# Patient Record
Sex: MALE | Race: BLACK OR AFRICAN AMERICAN | NOT HISPANIC OR LATINO | Employment: OTHER | URBAN - METROPOLITAN AREA
[De-identification: names, ages, dates, MRNs, and addresses within clinical notes are randomized per-mention and may not be internally consistent; named-entity substitution may affect disease eponyms.]

---

## 2023-03-25 ENCOUNTER — NURSING HOME VISIT (OUTPATIENT)
Dept: POST ACUTE CARE | Facility: EXTERNAL LOCATION | Age: 83
End: 2023-03-25
Payer: MEDICARE

## 2023-03-25 DIAGNOSIS — I48.0 PAROXYSMAL ATRIAL FIBRILLATION (MULTI): ICD-10-CM

## 2023-03-25 DIAGNOSIS — J43.2 CENTRILOBULAR EMPHYSEMA (MULTI): ICD-10-CM

## 2023-03-25 DIAGNOSIS — G47.33 OBSTRUCTIVE SLEEP APNEA SYNDROME: ICD-10-CM

## 2023-03-25 DIAGNOSIS — I25.10 CORONARY ARTERY DISEASE INVOLVING NATIVE CORONARY ARTERY OF NATIVE HEART WITHOUT ANGINA PECTORIS: ICD-10-CM

## 2023-03-25 DIAGNOSIS — G30.9 MIXED ALZHEIMER'S AND VASCULAR DEMENTIA (MULTI): ICD-10-CM

## 2023-03-25 DIAGNOSIS — F01.50 MIXED ALZHEIMER'S AND VASCULAR DEMENTIA (MULTI): ICD-10-CM

## 2023-03-25 DIAGNOSIS — F02.80 MIXED ALZHEIMER'S AND VASCULAR DEMENTIA (MULTI): ICD-10-CM

## 2023-03-25 DIAGNOSIS — E78.5 DYSLIPIDEMIA: ICD-10-CM

## 2023-03-25 DIAGNOSIS — E46 PROTEIN-CALORIE MALNUTRITION, UNSPECIFIED SEVERITY (MULTI): Primary | ICD-10-CM

## 2023-03-25 PROBLEM — J44.9 COPD (CHRONIC OBSTRUCTIVE PULMONARY DISEASE) (MULTI): Status: ACTIVE | Noted: 2023-03-25

## 2023-03-25 PROBLEM — D64.9 ANEMIA: Status: RESOLVED | Noted: 2023-03-25 | Resolved: 2023-03-25

## 2023-03-25 PROBLEM — G47.30 SLEEP APNEA: Status: ACTIVE | Noted: 2023-03-25

## 2023-03-25 PROCEDURE — 99308 SBSQ NF CARE LOW MDM 20: CPT | Performed by: INTERNAL MEDICINE

## 2023-03-25 RX ORDER — DONEPEZIL HYDROCHLORIDE 10 MG/1
10 TABLET, FILM COATED ORAL
COMMUNITY
Start: 2020-08-14

## 2023-03-25 RX ORDER — MIRTAZAPINE 15 MG/1
15 TABLET, FILM COATED ORAL
COMMUNITY
End: 2023-05-25 | Stop reason: SDUPTHER

## 2023-03-25 RX ORDER — ATORVASTATIN CALCIUM 40 MG/1
40 TABLET, FILM COATED ORAL DAILY
COMMUNITY

## 2023-03-25 RX ORDER — QUETIAPINE FUMARATE 25 MG/1
25 TABLET, FILM COATED ORAL
COMMUNITY
Start: 2023-01-26

## 2023-03-25 NOTE — LETTER
Patient: Flaco White  : 1940    Encounter Date: 2023    Patient ID: Flaco White is a 82 y.o. male who presents for No chief complaint on file..    Assessment/Plan    Problem List Items Addressed This Visit          Nervous    Mixed Alzheimer's and vascular dementia (CMS/HCC)     B12 folic acid Aricept Seroquel         Sleep apnea       Respiratory    COPD (chronic obstructive pulmonary disease) (CMS/HCC)     Inhaler trial Bambi flu pneumonia COVID-19 vaccine            Circulatory    CAD (coronary artery disease)    Paroxysmal atrial fibrillation (CMS/HCC)       Endocrine/Metabolic    Protein-calorie malnutrition, unspecified severity (CMS/HCC) - Primary     Dietitian evaluation Ensure twice a day            Other    Dyslipidemia     Continue Lipitor low-fat diet            Source of history: Nurse, Medical personnel, Medical record, Patient.  History limitation: None.      HPI  This is a 82-year-old patient who had a complex medical problem advanced dementia behavior problem COPD moderate protein calorie malnutrition atrial fibrillation atherosclerotic heart disease hypertension hyperlipidemia sleep apnea osteopenia osteoarthritis evaluated for dementia associated with arthralgia myalgia fatigue tired forgetfulness cough congestions decreased ADL mobility because of the dementia complicated with chronic lung disease and heart disease  Not on File    Medications     Bisacodyl Suppository 10 MG Active 2023     MIRALAX (Polyetheylene glycol) LAXATIVE POWDER Active 2023     Atorvastatin Calcium Oral Tablet 40 MG (Atorvastatin Calcium) Active 2023    Aricept Tablet 10 MG (Donepezil HCl) Active 2023    QUEtiapine Fumarate Tablet 25 MG Active 2023    Acetaminophen Tablet 325 MG Active 2/3/2023     Icy Hot External Liquid (Menthol (Topical Analgesic)) Active 3/16/2023 3/16/2023  Current Outpatient Medications   Medication Sig Dispense Refill   •  donepezil (Aricept) 10 mg tablet Take 1 tablet (10 mg) by mouth.     • QUEtiapine (SEROquel) 25 mg tablet Take 1 tablet (25 mg) by mouth.     • atorvastatin (Lipitor) 40 mg tablet Take 1 tablet (40 mg) by mouth once daily.     • mirtazapine (Remeron) 15 mg tablet Take 1 tablet (15 mg) by mouth.       No current facility-administered medications for this visit.       Objective  Visit Vitals  Smoking Status Former     Current Vitals   BP: 118/70 mmHg  3/21/2023 21:52    Temp:97.6 °F  3/21/2023 21:52  Pulse:70 bpm  3/21/2023 21:52  Weight:118.6 Lbs  3/6/2023 06:57  Resp:16 Breaths/min  3/21/2023 21:52  BS:  O2:98 %  3/21/2023 21:52  Pain:2  3/25/2023 09:11    PHYSICAL EXAM  General: NAD. NCAT. Aox3   HEENT: PERRLA. EOMI. MMM. Nares patent bl.  Cardiovascular: Crackles rales rhonchi  Respiratory: Heart murmur  GI: Soft, NT abdomen. BS present x 4.   : No CVAT BL  MSK: ROM x 4. CTLS non-tender.   Extremities: No edema. Cap refill < 2 sec.   Skin: No rashes or bruises.   Neuro: Dementia the behavior problem psych: Mood wnl.    ROS  Constitutional: Denies fevers, chills, fatigue, weight loss/gain  HEENT: Denies HA, vision changes, hearing loss, sore throat  Cardiac: Denies CP, palpitations, edema  Respiratory: Cough   GI: Constipation  : Denies urinary changes, frequency, hematuria, urgency, retention, flank pain  MSK: Denies joint pain, joint swelling, back pain, neck pain, extremity pain  Neuro: Dementia      There is no immunization history on file for this patient.    No visits with results within 4 Month(s) from this visit.   Latest known visit with results is:   Legacy Encounter on 09/28/2022   Component Date Value Ref Range Status   • Ventricular Rate 09/28/2022 58  BPM Final   • Atrial Rate 09/28/2022 58  BPM Final   • SC Interval 09/28/2022 134  ms Final   • QRS Duration 09/28/2022 70  ms Final   • QT Interval 09/28/2022 374  ms Final   • QTC Calculation(Bazett) 09/28/2022 367  ms Final   • P Axis 09/28/2022  79  degrees Final   • R Axis 09/28/2022 7  degrees Final   • T Axis 09/28/2022 77  degrees Final   • QRS Count 09/28/2022 10  beats Final   • Q Onset 09/28/2022 225  ms Final   • P Onset 09/28/2022 158  ms Final   • P Offset 09/28/2022 204  ms Final   • T Offset 09/28/2022 412  ms Final   • QTC Fredericia 09/28/2022 369  ms Final       Radiology: Reviewed imaging in powerchart.  No results found.    No family history on file.  Social History     Socioeconomic History   • Marital status:      Spouse name: None   • Number of children: None   • Years of education: None   • Highest education level: None   Occupational History   • None   Tobacco Use   • Smoking status: Former     Types: Cigarettes   • Smokeless tobacco: Never   Vaping Use   • Vaping status: None   Substance and Sexual Activity   • Alcohol use: Not Currently   • Drug use: Not Currently   • Sexual activity: None   Other Topics Concern   • None   Social History Narrative   • None     Social Determinants of Health     Financial Resource Strain: Not on file   Food Insecurity: Not on file   Transportation Needs: Not on file   Physical Activity: Not on file   Stress: Not on file   Social Connections: Not on file   Intimate Partner Violence: Not on file   Housing Stability: Not on file     Past Medical History:   Diagnosis Date   • Anemia 03/25/2023   • Arthralgia of temporomandibular joint, unspecified side     TMJ syndrome   • Chronic obstructive pulmonary disease, unspecified (CMS/HCC) 06/14/2013    Chronic obstructive pulmonary disease   • Essential (primary) hypertension 06/14/2013    Hypertension   • Other enthesopathies, not elsewhere classified 05/07/2020    Tendinitis of right shoulder   • Personal history of other diseases of the circulatory system     History of atrial fibrillation   • Personal history of other diseases of the digestive system     History of constipation   • Personal history of other diseases of the musculoskeletal system and  connective tissue     Personal history of arthritis   • Personal history of other specified conditions 08/14/2020    History of short term memory loss   • Personal history of other specified conditions     History of chest pain   • Personal history of other specified conditions     History of gynecomastia   • Rectal polyp 06/14/2013    Rectal polyp     Past Surgical History:   Procedure Laterality Date   • CARDIAC SURGERY  07/05/2016    Heart Surgery   • CATARACT EXTRACTION  09/16/2014    Cataract Surgery   • COLONOSCOPY  07/05/2016    Colonoscopy (Fiberoptic)   • INCISIONAL BREAST BIOPSY  09/16/2014    Incisional Breast Biopsy     * Cannot find OR log *    Charting was completed using voice recognition technology and may include unintended errors.           Electronically Signed By: Fabien Beltran MD   3/25/23  2:00 PM

## 2023-03-25 NOTE — PROGRESS NOTES
Patient ID: Flaco White is a 82 y.o. male who presents for No chief complaint on file..    Assessment/Plan     Problem List Items Addressed This Visit          Nervous    Mixed Alzheimer's and vascular dementia (CMS/HCC)     B12 folic acid Aricept Seroquel         Sleep apnea       Respiratory    COPD (chronic obstructive pulmonary disease) (CMS/HCC)     Inhaler trial Bambi flu pneumonia COVID-19 vaccine            Circulatory    CAD (coronary artery disease)    Paroxysmal atrial fibrillation (CMS/HCC)       Endocrine/Metabolic    Protein-calorie malnutrition, unspecified severity (CMS/HCC) - Primary     Dietitian evaluation Ensure twice a day            Other    Dyslipidemia     Continue Lipitor low-fat diet            Source of history: Nurse, Medical personnel, Medical record, Patient.  History limitation: None.      HPI  This is a 82-year-old patient who had a complex medical problem advanced dementia behavior problem COPD moderate protein calorie malnutrition atrial fibrillation atherosclerotic heart disease hypertension hyperlipidemia sleep apnea osteopenia osteoarthritis evaluated for dementia associated with arthralgia myalgia fatigue tired forgetfulness cough congestions decreased ADL mobility because of the dementia complicated with chronic lung disease and heart disease  Not on File    Medications     Bisacodyl Suppository 10 MG Active 1/26/2023     MIRALAX (Polyetheylene glycol) LAXATIVE POWDER Active 2/2/2023     Atorvastatin Calcium Oral Tablet 40 MG (Atorvastatin Calcium) Active 2/23/2023 2/23/2023    Aricept Tablet 10 MG (Donepezil HCl) Active 2/23/2023 2/23/2023    QUEtiapine Fumarate Tablet 25 MG Active 2/23/2023 2/23/2023    Acetaminophen Tablet 325 MG Active 2/3/2023     Icy Hot External Liquid (Menthol (Topical Analgesic)) Active 3/16/2023 3/16/2023  Current Outpatient Medications   Medication Sig Dispense Refill    donepezil (Aricept) 10 mg tablet Take 1 tablet (10 mg) by mouth.       QUEtiapine (SEROquel) 25 mg tablet Take 1 tablet (25 mg) by mouth.      atorvastatin (Lipitor) 40 mg tablet Take 1 tablet (40 mg) by mouth once daily.      mirtazapine (Remeron) 15 mg tablet Take 1 tablet (15 mg) by mouth.       No current facility-administered medications for this visit.       Objective   Visit Vitals  Smoking Status Former     Current Vitals   BP: 118/70 mmHg  3/21/2023 21:52    Temp:97.6 °F  3/21/2023 21:52  Pulse:70 bpm  3/21/2023 21:52  Weight:118.6 Lbs  3/6/2023 06:57  Resp:16 Breaths/min  3/21/2023 21:52  BS:  O2:98 %  3/21/2023 21:52  Pain:2  3/25/2023 09:11    PHYSICAL EXAM  General: NAD. NCAT. Aox3   HEENT: PERRLA. EOMI. MMM. Nares patent bl.  Cardiovascular: Crackles rales rhonchi  Respiratory: Heart murmur  GI: Soft, NT abdomen. BS present x 4.   : No CVAT BL  MSK: ROM x 4. CTLS non-tender.   Extremities: No edema. Cap refill < 2 sec.   Skin: No rashes or bruises.   Neuro: Dementia the behavior problem psych: Mood wnl.    ROS  Constitutional: Denies fevers, chills, fatigue, weight loss/gain  HEENT: Denies HA, vision changes, hearing loss, sore throat  Cardiac: Denies CP, palpitations, edema  Respiratory: Cough   GI: Constipation  : Denies urinary changes, frequency, hematuria, urgency, retention, flank pain  MSK: Denies joint pain, joint swelling, back pain, neck pain, extremity pain  Neuro: Dementia      There is no immunization history on file for this patient.    No visits with results within 4 Month(s) from this visit.   Latest known visit with results is:   Legacy Encounter on 09/28/2022   Component Date Value Ref Range Status    Ventricular Rate 09/28/2022 58  BPM Final    Atrial Rate 09/28/2022 58  BPM Final    AK Interval 09/28/2022 134  ms Final    QRS Duration 09/28/2022 70  ms Final    QT Interval 09/28/2022 374  ms Final    QTC Calculation(Bazett) 09/28/2022 367  ms Final    P Axis 09/28/2022 79  degrees Final    R Axis 09/28/2022 7  degrees Final    T Axis 09/28/2022 77   degrees Final    QRS Count 09/28/2022 10  beats Final    Q Onset 09/28/2022 225  ms Final    P Onset 09/28/2022 158  ms Final    P Offset 09/28/2022 204  ms Final    T Offset 09/28/2022 412  ms Final    QTC Fredericia 09/28/2022 369  ms Final       Radiology: Reviewed imaging in powerchart.  No results found.    No family history on file.  Social History     Socioeconomic History    Marital status:      Spouse name: None    Number of children: None    Years of education: None    Highest education level: None   Occupational History    None   Tobacco Use    Smoking status: Former     Types: Cigarettes    Smokeless tobacco: Never   Vaping Use    Vaping status: None   Substance and Sexual Activity    Alcohol use: Not Currently    Drug use: Not Currently    Sexual activity: None   Other Topics Concern    None   Social History Narrative    None     Social Determinants of Health     Financial Resource Strain: Not on file   Food Insecurity: Not on file   Transportation Needs: Not on file   Physical Activity: Not on file   Stress: Not on file   Social Connections: Not on file   Intimate Partner Violence: Not on file   Housing Stability: Not on file     Past Medical History:   Diagnosis Date    Anemia 03/25/2023    Arthralgia of temporomandibular joint, unspecified side     TMJ syndrome    Chronic obstructive pulmonary disease, unspecified (CMS/HCC) 06/14/2013    Chronic obstructive pulmonary disease    Essential (primary) hypertension 06/14/2013    Hypertension    Other enthesopathies, not elsewhere classified 05/07/2020    Tendinitis of right shoulder    Personal history of other diseases of the circulatory system     History of atrial fibrillation    Personal history of other diseases of the digestive system     History of constipation    Personal history of other diseases of the musculoskeletal system and connective tissue     Personal history of arthritis    Personal history of other specified conditions  08/14/2020    History of short term memory loss    Personal history of other specified conditions     History of chest pain    Personal history of other specified conditions     History of gynecomastia    Rectal polyp 06/14/2013    Rectal polyp     Past Surgical History:   Procedure Laterality Date    CARDIAC SURGERY  07/05/2016    Heart Surgery    CATARACT EXTRACTION  09/16/2014    Cataract Surgery    COLONOSCOPY  07/05/2016    Colonoscopy (Fiberoptic)    INCISIONAL BREAST BIOPSY  09/16/2014    Incisional Breast Biopsy     * Cannot find OR log *    Charting was completed using voice recognition technology and may include unintended errors.

## 2023-05-11 ENCOUNTER — APPOINTMENT (OUTPATIENT)
Dept: PRIMARY CARE | Facility: CLINIC | Age: 83
End: 2023-05-11
Payer: MEDICARE

## 2023-05-25 ENCOUNTER — OFFICE VISIT (OUTPATIENT)
Dept: PRIMARY CARE | Facility: CLINIC | Age: 83
End: 2023-05-25
Payer: MEDICARE

## 2023-05-25 VITALS
RESPIRATION RATE: 16 BRPM | BODY MASS INDEX: 18.59 KG/M2 | SYSTOLIC BLOOD PRESSURE: 120 MMHG | TEMPERATURE: 98.3 F | HEART RATE: 68 BPM | WEIGHT: 115.2 LBS | DIASTOLIC BLOOD PRESSURE: 70 MMHG

## 2023-05-25 DIAGNOSIS — M25.551 PAIN OF RIGHT HIP: ICD-10-CM

## 2023-05-25 DIAGNOSIS — I48.0 PAROXYSMAL ATRIAL FIBRILLATION (MULTI): ICD-10-CM

## 2023-05-25 DIAGNOSIS — B35.1 ONYCHOMYCOSIS: ICD-10-CM

## 2023-05-25 DIAGNOSIS — E78.5 DYSLIPIDEMIA: ICD-10-CM

## 2023-05-25 DIAGNOSIS — G30.9 MIXED ALZHEIMER'S AND VASCULAR DEMENTIA (MULTI): Primary | ICD-10-CM

## 2023-05-25 DIAGNOSIS — F01.50 MIXED ALZHEIMER'S AND VASCULAR DEMENTIA (MULTI): Primary | ICD-10-CM

## 2023-05-25 DIAGNOSIS — J43.2 CENTRILOBULAR EMPHYSEMA (MULTI): ICD-10-CM

## 2023-05-25 DIAGNOSIS — E46 PROTEIN-CALORIE MALNUTRITION, UNSPECIFIED SEVERITY (MULTI): ICD-10-CM

## 2023-05-25 DIAGNOSIS — F02.80 MIXED ALZHEIMER'S AND VASCULAR DEMENTIA (MULTI): Primary | ICD-10-CM

## 2023-05-25 PROBLEM — R09.89 HYPERINFLATION OF LUNGS: Status: ACTIVE | Noted: 2022-07-14

## 2023-05-25 PROBLEM — G89.29 CHRONIC RIGHT SHOULDER PAIN: Status: ACTIVE | Noted: 2022-07-07

## 2023-05-25 PROBLEM — M25.611 DECREASED RANGE OF MOTION OF RIGHT SHOULDER: Status: ACTIVE | Noted: 2022-07-07

## 2023-05-25 PROBLEM — Z86.79 HISTORY OF ATRIAL FIBRILLATION: Status: ACTIVE | Noted: 2022-11-10

## 2023-05-25 PROBLEM — M25.552 LEFT HIP PAIN: Status: ACTIVE | Noted: 2022-11-10

## 2023-05-25 PROBLEM — R41.82 ALTERED MENTAL STATUS: Status: ACTIVE | Noted: 2022-11-10

## 2023-05-25 PROBLEM — M75.31 CALCIFIC TENDONITIS OF RIGHT SHOULDER: Status: ACTIVE | Noted: 2022-07-07

## 2023-05-25 PROBLEM — M19.90 DEGENERATIVE JOINT DISEASE: Status: ACTIVE | Noted: 2023-05-25

## 2023-05-25 PROBLEM — M25.511 CHRONIC RIGHT SHOULDER PAIN: Status: ACTIVE | Noted: 2022-07-07

## 2023-05-25 PROBLEM — R41.0 DELIRIUM: Status: ACTIVE | Noted: 2022-11-14

## 2023-05-25 PROBLEM — K59.00 CONSTIPATION: Status: ACTIVE | Noted: 2019-12-06

## 2023-05-25 PROCEDURE — 1036F TOBACCO NON-USER: CPT | Performed by: INTERNAL MEDICINE

## 2023-05-25 PROCEDURE — 99214 OFFICE O/P EST MOD 30 MIN: CPT | Performed by: INTERNAL MEDICINE

## 2023-05-25 RX ORDER — WARFARIN SODIUM 5 MG/1
5 TABLET ORAL
COMMUNITY
Start: 2022-11-18 | End: 2023-05-25 | Stop reason: SINTOL

## 2023-05-25 RX ORDER — MEMANTINE HYDROCHLORIDE 10 MG/1
10 TABLET ORAL 2 TIMES DAILY
Qty: 60 TABLET | Refills: 5 | Status: SHIPPED | OUTPATIENT
Start: 2023-05-25 | End: 2023-11-21

## 2023-05-25 RX ORDER — MIRTAZAPINE 15 MG/1
15 TABLET, FILM COATED ORAL NIGHTLY
Qty: 60 TABLET | Refills: 2 | Status: SHIPPED | OUTPATIENT
Start: 2023-05-25 | End: 2023-07-24

## 2023-05-25 NOTE — PROGRESS NOTES
Flaco White is a 82 y.o. male   Patient with a past medical history of  Alzheimer's Disease, PAF (Previously on Warfarin), CAD, COPD, HLD, HTN, DAVINA not on CPAP, Severe Malnutrition, OA    Recently admitted for worsening dementia with agitation  Discharged to a nursing facility, discharged to [facility] house    Appetite is fair  Has fallen  Using walker  Not sleeping through night  Incontinent of bowel / bladder           Review of Systems     Constitutional: no fever, no chills, feeling poorly, feeling tired and no recent weight gain,  recent weight loss.   ENT: no earache, no hearing loss, no nosebleeds, no nasal discharge, no sore throat and no hoarseness.   Cardiovascular: the heart rate was not slow, the heart rate was not fast, no chest pain, no palpitations, no intermittent leg claudication and no lower extremity edema.   Respiratory: no cough, wheezing or shortness of breath at rest or exertion  Gastrointestinal: no abdominal pain, no constipation, no melena, no nausea, no diarrhea, no vomiting and no blood in stools.   Musculoskeletal: no arthralgias, no myalgias, no back pain, no joint swelling, no joint stiffness, no limb pain and no limb swelling.   Integumentary: no skin rashes, no skin lesions, no itching, no skin wound and no dry skin.   Neurological: alert x 1-2  All other systems have been reviewed and are negative for complaint.       Vitals:    05/25/23 1147   BP: 120/70   Pulse: 68   Resp: 16   Temp: 36.8 °C (98.3 °F)        Physical Exam     Constitutional   General appearance: Alert, thin appearing    Pulmonary   Respiratory assessment: No respiratory distress, normal respiratory rhythm and effort.    Auscultation of Lungs: Clear bilateral breath sounds.   Cardiovascular   Auscultation of heart: Apical pulse normal, heart rate and rhythm normal, normal S1 and S2, no murmurs and no pericardial rub.    Exam for edema: No peripheral edema.   Abdomen   Abdominal Exam: No bruits, normal bowel  sounds, soft, non-tender, no abdominal mass palpated.    Liver and Spleen exam: No hepato-splenomegaly.   Musculoskeletal   Examination of gait: shuffling gait  Inspection of digits and nails: No clubbing or cyanosis of the fingernails.    Inspection/palpation of joints, bones and muscles: No joint swelling. Normal movement of all extremities.   Skin   Skin inspection: Normal skin color and pigmentation, normal skin turgor and no visible rash.   Neurologic   Cranial nerves: Nerves 2-12 were intact, alert x 1-2    Assessment/Plan          Patient with a past medical history of  Alzheimer's Disease, PAF (Previously on Warfarin), CAD, COPD, HLD, HTN, DAVINA not on CPAP, Severe Malnutrition, OA    # Dementia  # Falls  Getting worse  Start Namenda  Neurology consult    # Malnutrition  Start Mirtazapine  Ensure BID    # Groin pain  I could not appreciate any hernias  Xrays  Tylenol for pain    # Afib  Off AC due to falls and poor balance    # HLD  Stable  Continue current medications    Podiatry referral